# Patient Record
Sex: MALE | Race: WHITE
[De-identification: names, ages, dates, MRNs, and addresses within clinical notes are randomized per-mention and may not be internally consistent; named-entity substitution may affect disease eponyms.]

---

## 2021-06-23 ENCOUNTER — HOSPITAL ENCOUNTER (INPATIENT)
Dept: HOSPITAL 11 - JP.ED | Age: 86
LOS: 3 days | Discharge: HOME | DRG: 292 | End: 2021-06-26
Attending: INTERNAL MEDICINE | Admitting: INTERNAL MEDICINE
Payer: MEDICARE

## 2021-06-23 DIAGNOSIS — Z79.51: ICD-10-CM

## 2021-06-23 DIAGNOSIS — Z95.1: ICD-10-CM

## 2021-06-23 DIAGNOSIS — Z88.0: ICD-10-CM

## 2021-06-23 DIAGNOSIS — I11.0: Primary | ICD-10-CM

## 2021-06-23 DIAGNOSIS — I25.10: ICD-10-CM

## 2021-06-23 DIAGNOSIS — Z79.899: ICD-10-CM

## 2021-06-23 DIAGNOSIS — J44.1: ICD-10-CM

## 2021-06-23 DIAGNOSIS — Z98.49: ICD-10-CM

## 2021-06-23 DIAGNOSIS — Z79.82: ICD-10-CM

## 2021-06-23 DIAGNOSIS — E78.00: ICD-10-CM

## 2021-06-23 DIAGNOSIS — R26.81: ICD-10-CM

## 2021-06-23 DIAGNOSIS — I25.2: ICD-10-CM

## 2021-06-23 DIAGNOSIS — Z88.8: ICD-10-CM

## 2021-06-23 DIAGNOSIS — E87.6: ICD-10-CM

## 2021-06-23 DIAGNOSIS — R32: ICD-10-CM

## 2021-06-23 DIAGNOSIS — I50.9: ICD-10-CM

## 2021-06-23 DIAGNOSIS — Z88.1: ICD-10-CM

## 2021-06-23 DIAGNOSIS — Z66: ICD-10-CM

## 2021-06-23 DIAGNOSIS — R09.02: ICD-10-CM

## 2021-06-23 DIAGNOSIS — Z99.81: ICD-10-CM

## 2021-06-23 PROCEDURE — C9113 INJ PANTOPRAZOLE SODIUM, VIA: HCPCS

## 2021-06-23 NOTE — PCM.HP.2
H&P History of Present Illness





- General


Date of Service: 06/23/21


Admit Problem/Dx: 


                           Admission Diagnosis/Problem





Admission Diagnosis/Problem      Chronic obstructive pulmonary disease with


                                 hypoxia








Source of Information: Patient, EMS Notes Reviewed, Provider, RN


History Limitations: Reports: No Limitations





- History of Present Illness


Initial Comments - Free Text/Narative: 





chief complaint: shortness of breath.





This is a 85 year old male presents to the ER via EMS, reports in on vacation at

one of the local resorts near [place], [place]. for one week. Develops shortness of 

breath about 4 days ago with the past two days increase in symptoms. Reports f

eeling dizzy, headache, shortness of breath with any type of activity. He had a 

painful cough.  weakness more today and increased difficulty with walking. Today

could not get up and ambulance was called 


Onset of Symptoms: Reports: Gradual


Symptom Onset Date: 06/20/21


Duration of Symptoms: Reports: Day(s):, Getting Worse


Location: Reports: Chest, Generalized


Quality: Reports: Same as Previous Episode


Severity: Moderate


Improves with: Reports: Rest


Worsens with: Reports: Movement


Context: Reports: Other (excerbation of chronic disease)


Associated Symptoms: Reports: Cough (painful cough), Fever/Chills (chills today 

without fever.), Shortness of Breath, Weakness


  ** Mid-Sternal Chest


Pain Score (Numeric/FACES): 5





- Related Data


Allergies/Adverse Reactions: 


                                    Allergies











Allergy/AdvReac Type Severity Reaction Status Date / Time


 


amoxicillin Allergy  Diarrhea Verified 06/23/21 18:19


 


benzocaine Allergy  Nausea Verified 06/23/21 18:19


 


Penicillins Allergy  Cannot Verified 06/23/21 17:46





   Remember  


 


ramipril Allergy  Cough Verified 06/23/21 18:19











Home Medications: 


                                    Home Meds





Albuterol [Proventil Neb Soln] 1 ampule INH QID PRN 06/23/21 [History]


Aspirin 81 mg PO DAILY 06/23/21 [History]


Aspirin/Dipyridamole [Aggrenox 200-25 MG] 1 cap PO BID 06/23/21 [History]


Azithromycin 250 mg PO DAILY 06/23/21 [History]


Budesonide/Formoterol Fumarate [Symbicort 160-4.5 Mcg Inhaler] 2 puff IH BID 

06/23/21 [History]


Cetirizine [ZyrTEC] 10 mg PO DAILY 06/23/21 [History]


ClonazePAM [KlonoPIN] 0.5 mg PO BID PRN 06/23/21 [History]


Finasteride 5 mg PO DAILY 06/23/21 [History]


Fluticasone Propionate [Flonase] 2 spray NASBOTH DAILY 06/23/21 [History]


Folic Acid 0.8 mg PO DAILY 06/23/21 [History]


Furosemide [Lasix] 40 mg PO Q48H 06/23/21 [History]


Gabapentin [Neurontin] 300 mg PO TID 06/23/21 [History]


Ipratropium [Atrovent HFA Inh] 1 puff INH Q6H PRN 06/23/21 [History]


Isosorbide Mononitrate [Imdur] 15 mg PO DAILY 06/23/21 [History]


Metoprolol Tartrate 50 mg PO BID 06/23/21 [History]


NIFEdipine [Adalat cc] 60 mg PO DAILY 06/23/21 [History]


Nitroglycerin 0.4 mg SL ASDIRECTED PRN 06/23/21 [History]


Omeprazole 20 mg PO ACBREAKFAST 06/23/21 [History]


Roflumilast [Daliresp] 500 mcg PO DAILY 06/23/21 [History]


Sertraline [Zoloft] 150 mg PO DAILY 06/23/21 [History]


Simethicone 80 mg PO QID PRN 06/23/21 [History]


Tamsulosin [Tamsulosin 24 Hr] 0.4 mg PO BEDTIME 06/23/21 [History]


Timolol [Betimol] 1 drop OP BEDTIME 06/23/21 [History]


Zolpidem Tartrate [Ambien] 5 mg PO BEDTIME 06/23/21 [History]


atorvaSTATin [Lipitor] 40 mg PO BEDTIME 06/23/21 [History]


guaiFENesin [Mucinex] 1,200 mg PO DAILY 06/23/21 [History]











Past Medical History


HEENT History: Reports: Cataract


Cardiovascular History: Reports: Bypass, Heart Failure, High Cholesterol, 

Hypertension, MI, SOB on Exertion


Respiratory History: Reports: COPD


Gastrointestinal History: Reports: Other (See Below)


Other Gastrointestinal History: Hernia


Genitourinary History: Reports: Urinary Incontinence


Musculoskeletal History: Reports: Fracture





- Past Surgical History


HEENT Surgical History: Reports: Cataract Surgery


Cardiovascular Surgical History: Reports: Coronary Artery Bypass





Social & Family History





- Tobacco Use


Tobacco Use Status *Q: Never Tobacco User





- Caffeine Use


Caffeine Use: Reports: Coffee





- Recreational Drug Use


Recreational Drug Use: No





- Living Situation & Occupation


Living situation: Reports: 


Occupation: Retired (lives with his Wife who is 33 years younger, together have 

a 25 year old Daughter and 8 year old adopted Son.)





H&P Review of Systems





- Review of Systems:


Review Of Systems: See Below


General: Reports: Chills, Malaise, Weakness, Decreased Appetite


HEENT: Reports: Other (cataracts)


Pulmonary: Reports: Shortness of Breath, Wheezing, Pleuritic Chest Pain, Cough, 

Other (home oxygen and Cpap at night, can use oxygen during the day.)


Cardiovascular: Reports: No Symptoms, Dyspnea on Exertion.  Denies: Edema


Gastrointestinal: Reports: No Symptoms


Genitourinary: Reports: No Symptoms


Musculoskeletal: Reports: No Symptoms


Skin: Reports: No Symptoms


Psychiatric: Reports: No Symptoms


Neurological: Reports: No Symptoms


Hematologic/Lymphatic: Reports: No Symptoms


Immunologic: Reports: No Symptoms





Exam





- Exam


Exam: See Below





- Vital Signs


Vital Signs: 


                                Last Vital Signs











Temp  97.6 F   06/23/21 20:46


 


Pulse  73   06/23/21 20:46


 


Resp  18   06/23/21 20:46


 


BP  135/87   06/23/21 20:46


 


Pulse Ox  98   06/23/21 20:46








                                        





Orthostatic Blood Pressure [     126/70


Standing]                        


Orthostatic Blood Pressure [     138/73


Sitting]                         


Orthostatic Blood Pressure [     134/72


Supine]                          








Weight: 185 lb





- Exam


Quality Assessment: Supplemental Oxygen, DVT Prophylaxis


General: Alert, Oriented, Cooperative, Mild Distress


HEENT: PERRLA, Conjunctiva Clear, EACs Clear, EOMI, Hearing Intact, Mucosa Moist

& Pink


Neck: Supple, Trachea Midline, Full Range of Motion


Lungs: Decreased Breath Sounds (bilateral), Crackles, Rales, Rhonchi, Wheezing 

(bilateral expiratory)


Cardiovascular: Regular Rate, Regular Rhythm, Normal S1, Normal S2, Systolic 

Murmur (3/6)


GI/Abdominal Exam: Normal Bowel Sounds, Soft, Non-Tender


 (Male) Exam: Deferred


Rectal (Males) Exam: Deferred


Back Exam: Normal Inspection, Full Range of Motion


Extremities: Normal Inspection, Normal Range of Motion, Non-Tender, No Pedal 

Edema, Normal Capillary Refill


Peripheral Pulses: 2+: Brachial (L), Brachial (R)


Skin: Warm, Dry, Intact


Neurological: Cranial Nerves Intact, Reflexes Equal Bilateral


Neuro Extensive - Mental Status: Alert, Oriented x3, Normal Mood/Affect, Normal 

Cognition


Neuro Extensive - Motor, Sensory, Reflexes: CN II-XII Intact, Normal Gait, 

Normal Reflexes


Psychiatric: Alert, Normal Affect, Normal Mood





- Patient Data


Lab Results Last 24 hrs: 


                         Laboratory Results - last 24 hr











  06/23/21 06/23/21 06/23/21 Range/Units





  18:50 18:50 19:00 


 


WBC  7.3    (4.5-11.0)  K/uL


 


RBC  3.79 L    (4.30-5.90)  M/uL


 


Hgb  12.4    (12.0-15.0)  g/dL


 


Hct  38.6 L    (40.0-54.0)  %


 


MCV  102 H    (80-98)  fL


 


MCH  33 H    (27-31)  pg


 


MCHC  32    (32-36)  %


 


Plt Count  164    (150-400)  K/uL


 


Neut % (Auto)  60.2    (36-66)  %


 


Lymph % (Auto)  24.6    (24-44)  %


 


Mono % (Auto)  12.7 H    (2-6)  %


 


Eos % (Auto)  2.2    (2-4)  %


 


Baso % (Auto)  0.3    (0-1)  %


 


Sodium   142   (140-148)  mmol/L


 


Potassium   3.3 L   (3.6-5.2)  mmol/L


 


Chloride   103   (100-108)  mmol/L


 


Carbon Dioxide   27   (21-32)  mmol/L


 


Anion Gap   15.3 H   (5.0-14.0)  mmol/L


 


BUN   24 H   (7-18)  mg/dL


 


Creatinine   1.6 H   (0.8-1.3)  mg/dL


 


Est Cr Clr Drug Dosing   32.13   mL/min


 


Estimated GFR (MDRD)   41 L   (>60)  


 


Glucose   101   ()  mg/dL


 


Calcium   8.7   (8.5-10.1)  mg/dL


 


Total Bilirubin   0.3   (0.2-1.0)  mg/dL


 


AST   17   (15-37)  U/L


 


ALT   24   (12-78)  U/L


 


Alkaline Phosphatase   60   ()  U/L


 


Troponin I   < 0.017   (0.000-0.056)  ng/mL


 


NT-Pro-B Natriuret Pep   1699 H   (5-450)  pg/mL


 


Total Protein   6.3 L   (6.4-8.2)  g/dL


 


Albumin   3.6   (3.4-5.0)  g/dL


 


Globulin   2.7   (2.3-3.5)  g/dL


 


Albumin/Globulin Ratio   1.3   (1.2-2.2)  


 


Urine Color    Yellow  (YELLOW)  


 


Urine Appearance    Clear  (CLEAR)  


 


Urine pH    5.5  (5.0-8.0)  


 


Ur Specific Gravity    <= 1.005 L  (1.008-1.030)  


 


Urine Protein    30 H  (NEGATIVE)  mg/dL


 


Urine Glucose (UA)    Negative  (NEGATIVE)  mg/dL


 


Urine Ketones    Negative  (NEGATIVE)  mg/dL


 


Urine Occult Blood    Negative  (NEGATIVE)  


 


Urine Nitrite    Negative  (NEGATIVE)  


 


Urine Bilirubin    Negative  (NEGATIVE)  


 


Urine Urobilinogen    0.2  (0.2-1.0)  EU/dL


 


Ur Leukocyte Esterase    Negative  (NEGATIVE)  


 


Urine RBC    Not seen  (0-5)  


 


Urine WBC    Not seen  (0-5)  


 


Ur Epithelial Cells    Not seen  


 


Amorphous Sediment    Not seen  


 


Urine Bacteria    Not seen  


 


Urine Mucus    Not seen  


 


Urine Other      











Result Diagrams: 


                                 06/23/21 18:50





                                 06/23/21 18:50





Sepsis Event Note





- Evaluation


Sepsis Screening Result: No Definite Risk





- Focused Exam


Vital Signs: 


                                   Vital Signs











  Temp Pulse Resp BP Pulse Ox


 


 06/23/21 20:46  97.6 F  73  18  135/87  98


 


 06/23/21 20:16   72  18  116/83  97


 


 06/23/21 19:02   72  14  140/75  98


 


 06/23/21 18:38   72  16  142/78 H  97


 


 06/23/21 17:51  97.8 F  73  17  144/78 H  99


 


 06/23/21 17:49  97.8 F  73  17  144/78 H  99














- Problem List


(1) Acute exacerbation of chronic obstructive pulmonary disease (COPD)


SNOMED Code(s): 514073154


   ICD Code: J44.1 - CHRONIC OBSTRUCTIVE PULMONARY DISEASE W (ACUTE) 

EXACERBATION   Status: Acute   Priority: High   Current Visit: Yes   





(2) Coronary artery disease


SNOMED Code(s): 81057991


   ICD Code: I25.10 - ATHSCL HEART DISEASE OF NATIVE CORONARY ARTERY W/O ANG 

PCTRS   Status: Acute   Priority: Low   Current Visit: Yes   


Qualifiers: 


   Coronary Disease-Associated Artery/Lesion type: unspecified vessel or lesion 

type   Native vs. transplanted heart: native heart 





(3) Hypertension


SNOMED Code(s): 47140000


   ICD Code: I10 - ESSENTIAL (PRIMARY) HYPERTENSION   Status: Acute   Priority: 

High   Current Visit: Yes   


Qualifiers: 


   Hypertension type: essential hypertension   Qualified Code(s): I10 - 

Essential (primary) hypertension   





(4) Hypokalemia


SNOMED Code(s): 51616348


   ICD Code: E87.6 - HYPOKALEMIA   Status: Acute   Priority: Medium   Current 

Visit: Yes   


Problem List Initiated/Reviewed/Updated: Yes


Orders Last 24hrs: 


                               Active Orders 24 hr











 Category Date Time Status


 


 Patient Status Manage Transfer [TRANSFER] Routine ADT  06/23/21 22:38 Active


 


 EKG Documentation Completion [RC] ASDIRECTED Care  06/23/21 18:43 Active


 


 Orthostatic Vital Signs [RC] ASDIRECTED Care  06/23/21 17:43 Active


 


 Chest 2V [CR] Stat Exams  06/23/21 18:43 Taken


 


 Sodium Chloride 0.9% [Normal Saline] 1,000 ml Med  06/23/21 18:45 Active





 IV ASDIRECTED   


 


 Resuscitation Status Routine Resus Stat  06/23/21 22:43 Ordered


 


 EKG 12 Lead [EK] Routine Ther  06/23/21 18:43 Ordered








                                Medication Orders





Sodium Chloride (Normal Saline)  1,000 mls @ 500 mls/hr IV ASDIRECTED BRADY


   Last Admin: 06/23/21 18:58  Dose: 500 mls/hr


   Documented by: MILLIE








Assessment/Plan Comment:: 


 Assessment/Plan Comment:: 


ASSESSMENT AND PLAN OF CARE- COPD WITH HYPOXIA





reports being short of breath for 4 days the past two day worse with shortness 

of breath and worsen cough. 





ER workup shows elevated  WBC 7.3, hgb 12.4, hct 38.6, Chemistry Na+ 142, K+ 

3.3, Cl 103, anion gap 15.3, BUN 24, Cr 1.6, glucose 101, Co2 27, .  


Xray chest with fibrosis.





Plan to hospital admission for further care and treatment. Patient agree with 

plan of care.





COPD with hypoxia


-Admit to [address] [place] [address] for further monitoring


-IV Fluids for rehydration NS at 75 ml/hr


-IV Antibiotic; Rocephin 1 gram IV every 24 hours


-IV Zithromax 500mg every 24 hours     


-oxygen to keep sats greater than 95%


-IV Solumedrol 62.5 mg every 6 hours  


-schedule Duo nebs every 6 hours, Albuterol nebs every 4 hours prn  


-Cpap at night with oxygen                  


-Advise to notify nurses of any chest pain or other symptoms


-And a.m. labs: CBC, BMP 





Coronary Artery Disease


-continue outpatient medication plan





Hypertension


-continue outpatient medication





Hypokalemia- Potassium 3.3 on admission


-Potassium 20 meq one time


-recheck Potassium in am





Maintenance issues


-Orders home medications: chronic medication


-Nutrition- regular diet


-Mane catheter -not indicated at this time


-DVT - Lovenox 40 mg subcut daily


-PPI - IV Protonix 40mg daily





CODE STATUS: DNR/DNI





Admission status: Admit to [address] [place] [address] justification.  This patient will be admitted for inpatient services 

and is medically appropriate meeting medical necessity for inpatient admission 

as outlined in my documentation.


I reasonably expect the patient will require inpatient services that span.  Time

 over 2 midnights.  I reasonably expect this patient to be discharged or 

transferred within 96 hours after admission to the critical access hospital.





Disposition: home with Family





Primary care provider: Dr. Amrit Gil, [facility].





Hospitalist: Dr. Saavedra








- Mortality Measure


Prognosis:: Good











- Mortality Measure


Prognosis:: Good

## 2021-06-23 NOTE — EDM.PDOC
ED HPI GENERAL MEDICAL PROBLEM





- General


Chief Complaint: Respiratory Problem


Stated Complaint: DIZZY VIA NORTH


Time Seen by Provider: 06/23/21 18:29


Source of Information: Reports: Patient


History Limitations: Reports: No Limitations





- History of Present Illness


INITIAL COMMENTS - FREE TEXT/NARRATIVE: 


Jordan is an 85-year-old male presenting to the ED for evaluation of lighthea

dedness for the last 3 days followed by shortness of breath that started 2 days 

ago and has progressed. The patient has a history significant for cardiac 

disease status post three-vessel CABG in 1996. He has a history of congestive 

heart failure. He also has a history of COPD from working in the [employer] 

industry for 36 years. In addition, he has BPH, hypertension, and 

hyperlipidemia. The patient was in his usual state of health when the dizziness 

began. He is not sure if he has been taking enough fluid. Today he was checking 

his home oxygen saturations and they were low in the 80s. He tried to use his 

rescue inhaler, however, he did not feel any better and EMS was called. He did 

receive a DuoNeb in route and had improvement in his oxygenation and his 

breathing. The patient is also been experiencing intermittent epigastric pain 

over the last 2 days. Episodes last a few seconds and are sharp and stabbing. 

They are not associated with any nausea or vomiting.





  ** Mid-Sternal Chest


Pain Score (Numeric/FACES): 5





- Related Data


                                    Allergies











Allergy/AdvReac Type Severity Reaction Status Date / Time


 


amoxicillin Allergy  Diarrhea Verified 06/23/21 18:19


 


benzocaine Allergy  Nausea Verified 06/23/21 18:19


 


Penicillins Allergy  Cannot Verified 06/23/21 17:46





   Remember  


 


ramipril Allergy  Cough Verified 06/23/21 18:19











Home Meds: 


                                    Home Meds





Albuterol [Proventil Neb Soln] 1 ampule INH QID PRN 06/23/21 [History]


Aspirin 81 mg PO DAILY 06/23/21 [History]


Aspirin/Dipyridamole [Aggrenox 200-25 MG] 1 cap PO BID 06/23/21 [History]


Azithromycin 250 mg PO DAILY 06/23/21 [History]


Budesonide/Formoterol Fumarate [Symbicort 160-4.5 Mcg Inhaler] 2 puff IH BID 

06/23/21 [History]


Cetirizine [ZyrTEC] 10 mg PO DAILY 06/23/21 [History]


ClonazePAM [KlonoPIN] 0.5 mg PO BID PRN 06/23/21 [History]


Finasteride 5 mg PO DAILY 06/23/21 [History]


Fluticasone Propionate [Flonase] 2 spray NASBOTH DAILY 06/23/21 [History]


Folic Acid 0.8 mg PO DAILY 06/23/21 [History]


Furosemide [Lasix] 40 mg PO Q48H 06/23/21 [History]


Gabapentin [Neurontin] 300 mg PO TID 06/23/21 [History]


Ipratropium [Atrovent HFA Inh] 1 puff INH Q6H PRN 06/23/21 [History]


Isosorbide Mononitrate [Imdur] 15 mg PO DAILY 06/23/21 [History]


Metoprolol Tartrate 50 mg PO BID 06/23/21 [History]


NIFEdipine [Adalat cc] 60 mg PO DAILY 06/23/21 [History]


Nitroglycerin 0.4 mg SL ASDIRECTED PRN 06/23/21 [History]


Omeprazole 20 mg PO ACBREAKFAST 06/23/21 [History]


Roflumilast [Daliresp] 500 mcg PO DAILY 06/23/21 [History]


Sertraline [Zoloft] 150 mg PO DAILY 06/23/21 [History]


Simethicone 80 mg PO QID PRN 06/23/21 [History]


Tamsulosin [Tamsulosin 24 Hr] 0.4 mg PO BEDTIME 06/23/21 [History]


Timolol [Betimol] 1 drop OP BEDTIME 06/23/21 [History]


Zolpidem Tartrate [Ambien] 5 mg PO BEDTIME 06/23/21 [History]


atorvaSTATin [Lipitor] 40 mg PO BEDTIME 06/23/21 [History]


guaiFENesin [Mucinex] 1,200 mg PO DAILY 06/23/21 [History]











Past Medical History


HEENT History: Reports: Cataract


Cardiovascular History: Reports: Bypass, Heart Failure, High Cholesterol, 

Hypertension, MI, SOB on Exertion


Respiratory History: Reports: COPD


Gastrointestinal History: Reports: Other (See Below)


Other Gastrointestinal History: Hernia


Genitourinary History: Reports: Urinary Incontinence


Musculoskeletal History: Reports: Fracture





- Past Surgical History


HEENT Surgical History: Reports: Cataract Surgery


Cardiovascular Surgical History: Reports: Coronary Artery Bypass





Social & Family History





- Tobacco Use


Tobacco Use Status *Q: Never Tobacco User





- Caffeine Use


Caffeine Use: Reports: Coffee





- Recreational Drug Use


Recreational Drug Use: No





ED ROS GENERAL





- Review of Systems


Review Of Systems: See Below


Constitutional: Reports: Malaise


HEENT: Reports: No Symptoms


Respiratory: Reports: Shortness of Breath, Wheezing, Cough.  Denies: Sputum


Cardiovascular: Reports: Edema, Lightheadedness


Endocrine: Reports: No Symptoms


GI/Abdominal: Reports: Abdominal Pain (Intermittent sharp, stabbing epigastric 

pain lasting a few seconds.)


: Reports: No Symptoms


Musculoskeletal: Reports: No Symptoms


Skin: Reports: No Symptoms


Neurological: Reports: No Symptoms


Psychiatric: Reports: No Symptoms


Hematologic/Lymphatic: Reports: No Symptoms


Immunologic: Reports: No Symptoms





ED EXAM, GENERAL





- Physical Exam


Exam: See Below


Exam Limited By: No Limitations


General Appearance: Alert, Mild Distress


Eye Exam: Bilateral Eye: EOMI, PERRL


Throat/Mouth: Normal Inspection, Normal Oropharynx, Normal Voice, No Airway 

Compromise


Head: Atraumatic, Normocephalic


Neck: Normal Inspection, Supple, Non-Tender, Full Range of Motion.  No: Carotid 

Bruit


Respiratory/Chest: No Respiratory Distress, No Accessory Muscle Use, Chest Non-

Tender, Decreased Breath Sounds (In the bases bilaterally), Wheezing (Scant 

inspiratory with pronounced expiratory wheezes throughout the lung fields 

bilaterally.), Prolonged Expiration


Cardiovascular: Normal Peripheral Pulses, Regular Rate, Rhythm, Systolic Murmur 

(3/6 systolic ejection murmur)


Peripheral Pulses: 2+: Radial (L), Radial (R), Posterior Tibial (L), Posterior 

Tibial (R)


GI/Abdominal: Normal Bowel Sounds, Soft, Non-Tender


Back Exam: Normal Inspection


Extremities: Normal Inspection, Normal Range of Motion, Pedal Edema (1+)


Neurological: Alert, Oriented, Normal Cognition, No Motor/Sensory Deficits


Psychiatric: Normal Affect


Skin Exam: Warm, Dry, Intact, Normal Color


Lymphatic: No Adenopathy


  ** #1 Interpretation


EKG Date: 06/23/21


Time: 18:52


Rhythm: NSR


Rate (Beats/Min): 71


Axis: Normal


P-Wave: Present (Prolonged VA interval at 246 ms)


QRS: Normal (Nonspecific interventricular conduction delay)


ST-T: Normal


QT: Prolonged


Comparison: NA - No Prior EKG





Course





- Vital Signs


Last Recorded V/S: 


                                Last Vital Signs











Temp  36.4 C   06/23/21 20:46


 


Pulse  73   06/23/21 20:46


 


Resp  18   06/23/21 20:46


 


BP  135/87   06/23/21 20:46


 


Pulse Ox  98   06/23/21 20:46








                                        





Orthostatic Blood Pressure [     126/70


Standing]                        


Orthostatic Blood Pressure [     138/73


Sitting]                         


Orthostatic Blood Pressure [     134/72


Supine]                          











- Orders/Labs/Meds


Orders: 


                               Active Orders 24 hr











 Category Date Time Status


 


 EKG Documentation Completion [RC] ASDIRECTED Care  06/23/21 18:43 Active


 


 Orthostatic Vital Signs [RC] ASDIRECTED Care  06/23/21 17:43 Active


 


 Chest 2V [CR] Stat Exams  06/23/21 18:43 Taken


 


 Sodium Chloride 0.9% [Normal Saline] 1,000 ml Med  06/23/21 18:45 Active





 IV ASDIRECTED   


 


 EKG 12 Lead [EK] Routine Ther  06/23/21 18:43 Ordered








                                Medication Orders





Sodium Chloride (Normal Saline)  1,000 mls @ 500 mls/hr IV ASDIRECTED BRADY


   Last Admin: 06/23/21 18:58  Dose: 500 mls/hr


   Documented by: MILLIE








Labs: 


                                Laboratory Tests











  06/23/21 06/23/21 06/23/21 Range/Units





  18:50 18:50 19:00 


 


WBC  7.3    (4.5-11.0)  K/uL


 


RBC  3.79 L    (4.30-5.90)  M/uL


 


Hgb  12.4    (12.0-15.0)  g/dL


 


Hct  38.6 L    (40.0-54.0)  %


 


MCV  102 H    (80-98)  fL


 


MCH  33 H    (27-31)  pg


 


MCHC  32    (32-36)  %


 


Plt Count  164    (150-400)  K/uL


 


Neut % (Auto)  60.2    (36-66)  %


 


Lymph % (Auto)  24.6    (24-44)  %


 


Mono % (Auto)  12.7 H    (2-6)  %


 


Eos % (Auto)  2.2    (2-4)  %


 


Baso % (Auto)  0.3    (0-1)  %


 


Sodium   142   (140-148)  mmol/L


 


Potassium   3.3 L   (3.6-5.2)  mmol/L


 


Chloride   103   (100-108)  mmol/L


 


Carbon Dioxide   27   (21-32)  mmol/L


 


Anion Gap   15.3 H   (5.0-14.0)  mmol/L


 


BUN   24 H   (7-18)  mg/dL


 


Creatinine   1.6 H   (0.8-1.3)  mg/dL


 


Est Cr Clr Drug Dosing   32.13   mL/min


 


Estimated GFR (MDRD)   41 L   (>60)  


 


Glucose   101   ()  mg/dL


 


Calcium   8.7   (8.5-10.1)  mg/dL


 


Total Bilirubin   0.3   (0.2-1.0)  mg/dL


 


AST   17   (15-37)  U/L


 


ALT   24   (12-78)  U/L


 


Alkaline Phosphatase   60   ()  U/L


 


Troponin I   < 0.017   (0.000-0.056)  ng/mL


 


NT-Pro-B Natriuret Pep   1699 H   (5-450)  pg/mL


 


Total Protein   6.3 L   (6.4-8.2)  g/dL


 


Albumin   3.6   (3.4-5.0)  g/dL


 


Globulin   2.7   (2.3-3.5)  g/dL


 


Albumin/Globulin Ratio   1.3   (1.2-2.2)  


 


Urine Color    Yellow  (YELLOW)  


 


Urine Appearance    Clear  (CLEAR)  


 


Urine pH    5.5  (5.0-8.0)  


 


Ur Specific Gravity    <= 1.005 L  (1.008-1.030)  


 


Urine Protein    30 H  (NEGATIVE)  mg/dL


 


Urine Glucose (UA)    Negative  (NEGATIVE)  mg/dL


 


Urine Ketones    Negative  (NEGATIVE)  mg/dL


 


Urine Occult Blood    Negative  (NEGATIVE)  


 


Urine Nitrite    Negative  (NEGATIVE)  


 


Urine Bilirubin    Negative  (NEGATIVE)  


 


Urine Urobilinogen    0.2  (0.2-1.0)  EU/dL


 


Ur Leukocyte Esterase    Negative  (NEGATIVE)  


 


Urine RBC    Not seen  (0-5)  


 


Urine WBC    Not seen  (0-5)  


 


Ur Epithelial Cells    Not seen  


 


Amorphous Sediment    Not seen  


 


Urine Bacteria    Not seen  


 


Urine Mucus    Not seen  


 


Urine Other      











Meds: 


Medications











Generic Name Dose Route Start Last Admin





  Trade Name Freq  PRN Reason Stop Dose Admin


 


Sodium Chloride  1,000 mls @ 500 mls/hr  06/23/21 18:45  06/23/21 18:58





  Normal Saline  IV   500 mls/hr





  ASDIRECTED BRADY   Administration














Discontinued Medications














Generic Name Dose Route Start Last Admin





  Trade Name Freq  PRN Reason Stop Dose Admin


 


Al Hydroxide/Mg Hydroxide 15  0 ml  06/23/21 20:43  06/23/21 21:05





  ml/ Lidocaine HCl 15 ml  PO  06/23/21 20:44  30 ml





  ONETIME ONE   Administration














- Radiology Interpretation


Free Text/Narrative:: 


I reviewed the two-view chest x-ray showing hyperinflation of the lungs. Very 

torturous thoracic aorta and normal cardiac silhouette. There is no evidence for

 acute infiltrates.








- Re-Assessments/Exams


Free Text/Narrative Re-Assessment/Exam: 





06/23/21 20:36 I reviewed the patient's chest x-ray showing hyperinflation and 

fibrosis consistent with COPD. He has a very torturous thoracic aorta and normal

 cardiac silhouette. I reviewed his labs showing a leukocyte count of 7.3 with a

 hemoglobin of 12.4 and hematocrit of 38.6. Platelet count is 164,000. His 

comprehensive metabolic panel is significant for sodium 142, potassium 3.3, 

chloride of 103, bicarbonate of 27, BUN of 24 with a creatinine 1.6. His glucose

 is 101. Troponin is negative at less than 0.017. BNP is elevated at 1699. 

Urinalysis was unremarkable. Appears to be acute exacerbation of COPD. The 

patient is already on an aggressive assortment of inhalers.  The wife has 

concerns about taking him home given his limited mobility at this time.  

Normally he is able ambulate without assistance.





06/23/21 21:45 nursing got the patient up and tried to ambulate with him.  He 

desatted very quickly to below 89% on room air.  He was fairly unsteady on his 

feet raising concern about fall at home.  Given his acute exacerbation of COPD 

with hypoxia and his increased unsteadiness, I am recommending that we probably 

admit him for further care.  I discussed the case with Karina Damon CNP who 

agrees with this plan.











Departure





- Departure


Time of Disposition: 22:26


Disposition: Admitted As Inpatient 67


Clinical Impression: 


 Acute exacerbation of chronic obstructive pulmonary disease (COPD), Gait 

instability, Hypoxia








- Discharge Information


Referrals: 


PCP,None [Primary Care Provider] - 


Forms:  ED Department Discharge





Sepsis Event Note (ED)





- Evaluation


Sepsis Screening Result: No Definite Risk





- Focused Exam


Vital Signs: 


                                   Vital Signs











  Temp Pulse Resp BP Pulse Ox


 


 06/23/21 20:46  36.4 C  73  18  135/87  98


 


 06/23/21 20:16   72  18  116/83  97


 


 06/23/21 19:02   72  14  140/75  98


 


 06/23/21 18:38   72  16  142/78 H  97


 


 06/23/21 17:51  36.6 C  73  17  144/78 H  99


 


 06/23/21 17:49  36.6 C  73  17  144/78 H  99














- Problem List & Annotations


(1) Acute exacerbation of chronic obstructive pulmonary disease (COPD)


SNOMED Code(s): 672842978


   Code(s): J44.1 - CHRONIC OBSTRUCTIVE PULMONARY DISEASE W (ACUTE) EXACERBATION

  Status: Acute   Priority: High   Current Visit: Yes   





(2) Gait instability


SNOMED Code(s): 60323045


   Code(s): R26.81 - UNSTEADINESS ON FEET   Status: Acute   Priority: High   

Current Visit: Yes   





(3) Hypoxia


SNOMED Code(s): 439026317


   Code(s): R09.02 - HYPOXEMIA   Status: Acute   Priority: High   Current Visit:

Yes   





- Problem List Review


Problem List Initiated/Reviewed/Updated: Yes





- My Orders


Last 24 Hours: 


My Active Orders





06/23/21 18:43


EKG Documentation Completion [RC] ASDIRECTED 


Chest 2V [CR] Stat 


EKG 12 Lead [EK] Routine 





06/23/21 18:45


Sodium Chloride 0.9% [Normal Saline] 1,000 ml IV ASDIRECTED 














- Assessment/Plan


Last 24 Hours: 


My Active Orders





06/23/21 18:43


EKG Documentation Completion [RC] ASDIRECTED 


Chest 2V [CR] Stat 


EKG 12 Lead [EK] Routine 





06/23/21 18:45


Sodium Chloride 0.9% [Normal Saline] 1,000 ml IV ASDIRECTED

## 2021-06-24 RX ADMIN — TIMOLOL MALEATE SCH DROP: 5 SOLUTION OPHTHALMIC at 21:50

## 2021-06-24 RX ADMIN — DEXTROSE SCH MG: 50 INJECTION, SOLUTION INTRAVENOUS at 17:52

## 2021-06-24 RX ADMIN — Medication SCH CAP: at 15:09

## 2021-06-24 RX ADMIN — DEXTROSE SCH MG: 50 INJECTION, SOLUTION INTRAVENOUS at 05:29

## 2021-06-24 RX ADMIN — DEXTROSE SCH MG: 50 INJECTION, SOLUTION INTRAVENOUS at 12:35

## 2021-06-24 RX ADMIN — FLUTICASONE PROPIONATE SCH SPRAYS: 50 SPRAY, METERED NASAL at 08:18

## 2021-06-24 RX ADMIN — Medication SCH CAP: at 21:49

## 2021-06-24 RX ADMIN — DEXTROSE SCH MG: 50 INJECTION, SOLUTION INTRAVENOUS at 23:43

## 2021-06-24 RX ADMIN — DEXTROSE SCH MG: 50 INJECTION, SOLUTION INTRAVENOUS at 01:00

## 2021-06-24 RX ADMIN — NIFEDIPINE SCH MG: 30 TABLET, FILM COATED, EXTENDED RELEASE ORAL at 08:26

## 2021-06-24 RX ADMIN — Medication SCH MCG: at 15:08

## 2021-06-24 NOTE — CR
CHEST: 2 view

 

CLINICAL HISTORY:Dyspnea, dizziness

 

COMPARISON:None

 

FINDINGS:  Heart is mildly enlarged pulmonary vascularity is normal. Patient has

had a previous sternotomy. There are atherosclerotic changes in the aorta..

There is diffuse interstitial prominence. This may be chronic. Diffuse

pneumonitis is not excluded.

 

IMPRESSION: Cardiomegaly

 

Previous sternotomy

 

Generalized interstitial prominence. Some of this may be chronic. Some

superimposed pneumonitis or edema is not excluded

## 2021-06-24 NOTE — PCM.PN
- General Info


Date of Service: 06/24/21


Admission Dx/Problem (Free Text): 


                           Admission Diagnosis/Problem





Admission Diagnosis/Problem      Chronic obstructive pulmonary disease with


                                 hypoxia








Subjective Update: 


Mr. Albert had no events overnight.  He is saturating in the upper 90s on 1 L

nasal cannula which may not be necessary.  He is having a little trouble 

ambulating on his feet because he does have dizziness when he stands.  He had 

difficulty getting to the bathroom today and needed help.  His biggest complaint

today is weakness.  He is not coughing or producing any sputum.  He is not 

wheezing.  I did have a long discussion with him and his wife today regarding 

his symptoms and his prior medical conditions.  His wife stated that he has had 

this issue for a few days now and it seems to be more so associated with the 

epigastric pain rather than the shortness of breath.  She also states that when 

he had a heart attack in 1996 it expressed as epigastric pain, she specifically 

remembers this because they tried to treat him for stomach issues originally 

before discovering that he was having a heart attack.  Had a triple bypass at 

that time.  He has been on oxygen at night for approximately 10 years and has 

not been evaluated for the need for home oxygen during the daytime or with 

activity.  She says the last time that may have been addressed would have been 

over a year ago before the pandemic began when he was last seen for pneumonia.  

He states that he takes a Z-Fredi on a regular basis to prevent COPD exacerbations

and he is supposed to  this prescription for next week.


Functional Status: Reports: Pain Controlled, Tolerating Diet, Ambulating, 

Urinating





- Review of Systems


General: Reports: Weakness, Appetite.  Denies: Fever, Chills


HEENT: Reports: No Symptoms.  Denies: Headaches, Visual Changes


Pulmonary: Denies: Shortness of Breath, Cough, Wheezing


Cardiovascular: Reports: Edema, Lightheadedness.  Denies: Chest Pain, 

Palpitations


Gastrointestinal: Reports: No Symptoms.  Denies: Abdominal Pain, Constipation, 

Diarrhea, Nausea, Vomiting


Genitourinary: Reports: No Symptoms.  Denies: Dysuria, Frequency, Urgency


Musculoskeletal: Reports: No Symptoms


Skin: Reports: No Symptoms


Neurological: Reports: Dizziness, Weakness.  Denies: Confusion, Headache, 

Syncope


Psychiatric: Reports: No Symptoms.  Denies: Confusion





- Patient Data


Vitals - Most Recent: 


                                Last Vital Signs











Temp  96.6 F L  06/24/21 15:14


 


Pulse  84   06/24/21 15:14


 


Resp  18   06/24/21 15:14


 


BP  109/57 L  06/24/21 15:14


 


Pulse Ox  96   06/24/21 15:14








                                        





Orthostatic Blood Pressure [     126/70


Standing]                        


Orthostatic Blood Pressure [     138/73


Sitting]                         


Orthostatic Blood Pressure [     134/72


Supine]                          








Weight - Most Recent: 185 lb 0.014 oz


I&O - Last 24 Hours: 


                                 Intake & Output











 06/24/21 06/24/21 06/24/21





 06:59 14:59 22:59


 


Intake Total  960 


 


Balance  960 











Lab Results Last 24 Hours: 


                         Laboratory Results - last 24 hr











  06/23/21 06/24/21 06/24/21 Range/Units





  18:50 05:30 05:30 


 


WBC   7.2   (4.5-11.0)  K/uL


 


RBC   3.84 L   (4.30-5.90)  M/uL


 


Hgb   12.5   (12.0-15.0)  g/dL


 


Hct   39.1 L   (40.0-54.0)  %


 


MCV   102 H   (80-98)  fL


 


MCH   33 H   (27-31)  pg


 


MCHC   32   (32-36)  %


 


Plt Count   153   (150-400)  K/uL


 


Neut % (Auto)   90.7 H   (36-66)  %


 


Lymph % (Auto)   7.7 L   (24-44)  %


 


Mono % (Auto)   1.4 L   (2-6)  %


 


Eos % (Auto)   0.1 L   (2-4)  %


 


Baso % (Auto)   0.1   (0-1)  %


 


Sodium  142   144  (140-148)  mmol/L


 


Potassium  3.3 L   4.1  (3.6-5.2)  mmol/L


 


Chloride  103   107  (100-108)  mmol/L


 


Carbon Dioxide  27   28  (21-32)  mmol/L


 


Anion Gap  15.3 H   8.7  (5.0-14.0)  mmol/L


 


BUN  24 H   19 H  (7-18)  mg/dL


 


Creatinine  1.6 H   1.2  (0.8-1.3)  mg/dL


 


Est Cr Clr Drug Dosing  32.13   42.84  mL/min


 


Estimated GFR (MDRD)  41 L   58 L  (>60)  


 


Glucose  101   135 H  ()  mg/dL


 


Calcium  8.7   8.5  (8.5-10.1)  mg/dL


 


Total Bilirubin  0.3    (0.2-1.0)  mg/dL


 


AST  17    (15-37)  U/L


 


ALT  24    (12-78)  U/L


 


Alkaline Phosphatase  60    ()  U/L


 


Troponin I  < 0.017    (0.000-0.056)  ng/mL


 


NT-Pro-B Natriuret Pep  1699 H    (5-450)  pg/mL


 


Total Protein  6.3 L    (6.4-8.2)  g/dL


 


Albumin  3.6    (3.4-5.0)  g/dL


 


Globulin  2.7    (2.3-3.5)  g/dL


 


Albumin/Globulin Ratio  1.3    (1.2-2.2)  











Med Orders - Current: 


                               Current Medications





Acetaminophen (Acetaminophen 325 Mg Tab)  650 mg PO Q4H PRN


   PRN Reason: Pain (Mild 1-3)/fever


Albuterol (Albuterol 0.083% 2.5 Mg/3 Ml Neb Soln)  2.5 mg NEB Q4H PRN


   PRN Reason: Shortness Of Breath;wheezing


Albuterol/Ipratropium (Albuterol/Ipratropium 3.0-0.5 Mg/3 Ml Neb Soln)  3 ml NEB

QIDRT [facility]


   Last Admin: 06/24/21 14:32 Dose:  3 ml


   Documented by: 


Atorvastatin Calcium (Atorvastatin 20 Mg Tab)  40 mg PO BEDTIME BRADY


Azithromycin (Azithromycin 250 Mg Tab)  500 mg PO DAILY [facility]


Bisacodyl (Bisacodyl 5 Mg Tab)  5 mg PO DAILY PRN


   PRN Reason: Constipation


Clonazepam (Clonazepam 0.5 Mg Tab)  0.5 mg PO BID PRN


   PRN Reason: Anxiety


Docusate Sodium (Docusate Sodium 100 Mg Cap)  100 mg PO BID PRN


   PRN Reason: Constipation


Enoxaparin Sodium (Enoxaparin 40 Mg/0.4 Ml Syringe)  40 mg SUBCUT DAILY [facility]


   Last Admin: 06/24/21 08:19 Dose:  40 mg


   Documented by: 


Finasteride (Finasteride 5 Mg Tab)  5 mg PO DAILY [facility]


   Last Admin: 06/24/21 08:26 Dose:  5 mg


   Documented by: 


Fluticasone Propionate (Fluticasone Propionate Nasal Spray 16 Gm Bottle)  0 gm 

NASBOTH DAILY [facility]


   Last Admin: 06/24/21 08:18 Dose:  2 sprays


   Documented by: 


Folic Acid (Folic Acid 1 Mg Tab)  1 mg PO DAILY [facility]


   Last Admin: 06/24/21 12:35 Dose:  1 mg


   Documented by: 


Furosemide (Furosemide 40 Mg Tab)  40 mg PO Q48H [facility]


   Last Admin: 06/24/21 08:20 Dose:  40 mg


   Documented by: 


Gabapentin (Gabapentin 300 Mg Cap)  300 mg PO TID [facility]


   Last Admin: 06/24/21 14:24 Dose:  300 mg


   Documented by: 


Isosorbide Mononitrate (Isosorbide Mononitrate 30 Mg Tab.Er)  15 mg PO DAILY [facility]


   Last Admin: 06/24/21 08:21 Dose:  15 mg


   Documented by: 


Methylprednisolone Sodium Succinate (Methylprednisolone Sodium Succinate 125 

Mg/2 Ml Sdv)  62.5 mg IV Q6H [facility]


   Last Admin: 06/24/21 17:52 Dose:  62.5 mg


   Documented by: 


Metoprolol Tartrate (Metoprolol Tartrate 50 Mg Tab)  50 mg PO BID [facility]


   Last Admin: 06/24/21 08:24 Dose:  50 mg


   Documented by: 


Morphine Sulfate (Morphine 2 Mg/Ml Syringe)  2 mg IVPUSH Q2H PRN


   PRN Reason: Pain (severe 7-10)


Nifedipine (Nifedipine 30 Mg Tab.Er)  60 mg PO DAILY [facility]


   Last Admin: 06/24/21 08:26 Dose:  60 mg


   Documented by: 


Nitroglycerin (Nitroglycerin 0.4 Mg Tab.Sl)  0.4 mg SL ASDIRECTED PRN


   PRN Reason: Chest Pain


(Aspirin/Dipyridamole [ Aggrenox 200-25 Mg] 1 Cap Cap.Er)  0 cap PO BID [facility]


   Last Admin: 06/24/21 15:09 Dose:  1 cap


   Documented by: 


(Roflumilast [ Daliresp] 500 Mcg Tablet)*Pom*  0 mcg PO DAILY [facility]


   Last Admin: 06/24/21 15:08 Dose:  1 mcg


   Documented by: 


Ondansetron HCl (Ondansetron 4 Mg Tab.Dis)  4 mg PO Q6H PRN


   PRN Reason: Nausea able to take PO


Ondansetron HCl (Ondansetron 4 Mg/2 Ml Sdv)  4 mg IV Q4H PRN


   PRN Reason: Nausea/Vomiting


Oxycodone HCl (Oxycodone 5 Mg Tab)  5 mg PO Q4H PRN


   PRN Reason: Pain (moderate 4-6)


Pantoprazole Sodium (Pantoprazole 40 Mg Tab.Cr)  40 mg PO ACBREAKFAST [facility]


Sertraline HCl (Sertraline 50 Mg Tab)  150 mg PO DAILY [facility]


   Last Admin: 06/24/21 08:26 Dose:  150 mg


   Documented by: 


Simethicone (Simethicone 80 Mg Tab.Chew)  80 mg PO QID PRN


   PRN Reason: Gas


Tamsulosin HCl (Tamsulosin 0.4 Mg Cap.Er)  0.4 mg PO BEDTIME [facility]


   Last Admin: 06/24/21 00:57 Dose:  0.4 mg


   Documented by: 


Timolol Maleate (Timolol Maleate 0.5% Ophth Soln 5 Ml Bottle*Pom*)  0 ml EYEBOTH

BEDTIME BRADY


Zolpidem Tartrate (Zolpidem 5 Mg Tab)  5 mg PO BEDTIME [facility]


   Last Admin: 06/24/21 00:58 Dose:  5 mg


   Documented by: 





Discontinued Medications





Al Hydroxide/Mg Hydroxide 15 (ml/ Lidocaine HCl 15 ml)  0 ml PO ONETIME ONE


   Stop: 06/23/21 20:44


   Last Admin: 06/23/21 21:05 Dose:  30 ml


   Documented by: 


Furosemide (Furosemide 40 Mg Tab)  40 mg PO Q48H [facility]


   Last Admin: 06/24/21 01:29 Dose:  Not Given


   Documented by: 


Furosemide (Furosemide 40 Mg/4 Ml Vial)  40 mg IVPUSH ONETIME ONE


   Stop: 06/24/21 16:01


   Last Admin: 06/24/21 16:12 Dose:  40 mg


   Documented by: 


Sodium Chloride (Normal Saline)  1,000 mls @ 500 mls/hr IV ASDIRECTED [facility]


   Last Admin: 06/23/21 18:58 Dose:  500 mls/hr


   Documented by: 


Azithromycin 500 mg/ Sodium (Chloride)  250 mls @ 250 mls/hr IV Q24H [facility]


   Stop: 06/26/21 00:59


   Last Admin: 06/24/21 00:59 Dose:  250 mls/hr


   Documented by: 


Ceftriaxone Sodium 1 gm/ (Sodium Chloride)  50 mls @ 200 mls/hr IV Q24H [facility]


   Stop: 06/30/21 00:01


   Last Admin: 06/24/21 00:59 Dose:  200 mls/hr


   Documented by: 


Sodium Chloride (Normal Saline)  1,000 mls @ 75 mls/hr IV ASDIRECTED [facility]


   Last Admin: 06/24/21 14:24 Dose:  75 mls/hr


   Documented by: 


Pantoprazole Sodium (Pantoprazole 40 Mg Vial)  40 mg IV DAILY [facility]


   Last Admin: 06/24/21 08:29 Dose:  40 mg


   Documented by: 


Potassium Chloride (Potassium Chloride 20 Meq Tab.Er)  20 meq PO ONETIME ONE


   Stop: 06/23/21 23:46


   Last Admin: 06/24/21 00:58 Dose:  20 meq


   Documented by: 


Potassium Chloride (Potassium Chloride 20 Meq Tab.Er)  40 meq PO ONETIME ONE


   Stop: 06/24/21 16:01


   Last Admin: 06/24/21 16:12 Dose:  40 meq


   Documented by: 











- Exam


Quality Assessment: Supplemental Oxygen (1 L and may not be necessary)


General: Alert, Oriented, Cooperative, No Acute Distress


HEENT: Pupils Equal, EOMI, Mucous Membr. Moist/Pink


Lungs: Clear to Auscultation, Normal Respiratory Effort.  No: Wheezing


Cardiovascular: Regular Rate, Regular Rhythm


GI/Abdominal Exam: Normal Bowel Sounds, Soft, Non-Tender, No Distention


Back Exam: Normal Inspection


Extremities: Pedal Edema (1+ bilaterally to the lower calf)


Skin: Warm, Dry, Intact


Neurological: No New Focal Deficit


Psy/Mental Status: Alert, Normal Affect, Normal Mood





- Patient Data


Lab Results Last 24 hrs: 


                         Laboratory Results - last 24 hr











  06/23/21 06/24/21 06/24/21 Range/Units





  18:50 05:30 05:30 


 


WBC   7.2   (4.5-11.0)  K/uL


 


RBC   3.84 L   (4.30-5.90)  M/uL


 


Hgb   12.5   (12.0-15.0)  g/dL


 


Hct   39.1 L   (40.0-54.0)  %


 


MCV   102 H   (80-98)  fL


 


MCH   33 H   (27-31)  pg


 


MCHC   32   (32-36)  %


 


Plt Count   153   (150-400)  K/uL


 


Neut % (Auto)   90.7 H   (36-66)  %


 


Lymph % (Auto)   7.7 L   (24-44)  %


 


Mono % (Auto)   1.4 L   (2-6)  %


 


Eos % (Auto)   0.1 L   (2-4)  %


 


Baso % (Auto)   0.1   (0-1)  %


 


Sodium  142   144  (140-148)  mmol/L


 


Potassium  3.3 L   4.1  (3.6-5.2)  mmol/L


 


Chloride  103   107  (100-108)  mmol/L


 


Carbon Dioxide  27   28  (21-32)  mmol/L


 


Anion Gap  15.3 H   8.7  (5.0-14.0)  mmol/L


 


BUN  24 H   19 H  (7-18)  mg/dL


 


Creatinine  1.6 H   1.2  (0.8-1.3)  mg/dL


 


Est Cr Clr Drug Dosing  32.13   42.84  mL/min


 


Estimated GFR (MDRD)  41 L   58 L  (>60)  


 


Glucose  101   135 H  ()  mg/dL


 


Calcium  8.7   8.5  (8.5-10.1)  mg/dL


 


Total Bilirubin  0.3    (0.2-1.0)  mg/dL


 


AST  17    (15-37)  U/L


 


ALT  24    (12-78)  U/L


 


Alkaline Phosphatase  60    ()  U/L


 


Troponin I  < 0.017    (0.000-0.056)  ng/mL


 


NT-Pro-B Natriuret Pep  1699 H    (5-450)  pg/mL


 


Total Protein  6.3 L    (6.4-8.2)  g/dL


 


Albumin  3.6    (3.4-5.0)  g/dL


 


Globulin  2.7    (2.3-3.5)  g/dL


 


Albumin/Globulin Ratio  1.3    (1.2-2.2)  











Result Diagrams: 


                                 06/24/21 05:30





                                 06/24/21 05:30





Sepsis Event Note





- Evaluation


Sepsis Screening Result: No Definite Risk





- Focused Exam


Vital Signs: 


                                   Vital Signs











  Temp Pulse Pulse Resp BP BP Pulse Ox


 


 06/24/21 15:14  96.6 F L   84  18   109/57 L  96


 


 06/24/21 13:21        96


 


 06/24/21 10:38  97.1 F   92  16   131/64  95


 


 06/24/21 10:24    98    


 


 06/24/21 08:26      151/78 H  


 


 06/24/21 08:24   94    151/78 H  


 


 06/24/21 08:21      151/78 H  


 


 06/24/21 08:04  97.4 F   96  18   140/92 H  95














- Problem List Review


Problem List Initiated/Reviewed/Updated: Yes





- My Orders


Last 24 Hours: 


My Active Orders





06/24/21 11:54


Consult to Physical Therapy [PT Evaluation and Treatment] [CONS] Routine 





06/25/21 09:00


Azithromycin [Zithromax]   500 mg PO DAILY 














- Plan


Plan:: 


 


Acute decompensated heart failure versus COPD exacerbation with hypoxia or possi

ble worsening of COPD with new need for home oxygen


-IV Fluids for rehydration NS at 75 ml/hr


-IV Antibiotic; Rocephin 1 gram IV every 24 hours has been stopped


-IV Zithromax 500mg every 24 hours has been changed to oral   


-oxygen to keep sats greater than 95%


-IV Solumedrol 62.5 mg every 6 hours  


-schedule Duo nebs every 6 hours, Albuterol nebs every 4 hours prn  


-Cpap at night with oxygen                  


-Advise to notify nurses of any chest pain or other symptoms


-Was given 40 mg IV furosemide for concern for fluid overload with possible 

edema on chest x-ray and pedal edema, with patient's history of severe lung 

disease this could possibly be right heart failure related.  His most si

gnificant symptom today is weakness.





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


-continue outpatient medication plan





Essential hypertension


-continue outpatient medication





Hypokalemia- Potassium 3.3 on admission


-Potassium 20 meq one time





Maintenance issues


-Orders home medications: chronic medication


-Nutrition- regular diet


-Mane catheter -not indicated at this time


-DVT - Lovenox 40 mg subcut daily


-PPI - IV Protonix 40mg daily





CODE STATUS: DNR/DNI





Plan: I am concerned that this is not a COPD exacerbation as the patient is a 

poor historian and after getting more details from the wife this may be more 

heart related.  He has improved significantly and does not have a cough or 

sputum production.  He does have a cardiologist in [place].  I think he needs 

to have an echo done when he is discharged.  He was given IV furosemide for his 

fluid overload and will be reassessed in the morning.  If his fluid status has 

improved and he is able to walk then we will discharge him for close follow-up 

with his cardiologist in [place].





Disposition: home with Family





Primary care provider: Dr. Amrit Gil, [facility], [facility].





Silvia Saavedra DO

## 2021-06-25 RX ADMIN — TIMOLOL MALEATE SCH DROP: 5 SOLUTION OPHTHALMIC at 20:28

## 2021-06-25 RX ADMIN — FLUTICASONE PROPIONATE SCH: 50 SPRAY, METERED NASAL at 08:41

## 2021-06-25 RX ADMIN — Medication SCH MCG: at 08:38

## 2021-06-25 RX ADMIN — DEXTROSE SCH MG: 50 INJECTION, SOLUTION INTRAVENOUS at 12:15

## 2021-06-25 RX ADMIN — DEXTROSE SCH MG: 50 INJECTION, SOLUTION INTRAVENOUS at 05:27

## 2021-06-25 RX ADMIN — Medication SCH CAP: at 20:32

## 2021-06-25 RX ADMIN — NIFEDIPINE SCH MG: 30 TABLET, FILM COATED, EXTENDED RELEASE ORAL at 08:36

## 2021-06-25 RX ADMIN — Medication SCH CAP: at 08:39

## 2021-06-25 NOTE — PCM.PN
- General Info


Date of Service: 06/25/21


Admission Dx/Problem (Free Text): 


                           Admission Diagnosis/Problem





Admission Diagnosis/Problem      Acute decompensated heart failure








Subjective Update: 





Mr. Albert was frustrated this morning.  He is doing clinically significantly

better and having significantly less shortness of breath.  He was walked by 

physical therapy for 200 m and only desaturated to 88.  He is not needing oxygen

at baseline.  Will occasionally have momentary dips of oxygen saturation.  He 

has no other complaints besides shortness of breath.





I had to extensive conversations with his wife today because of her desire to 

transfer him to [facility].  I called the [facility] hospitalist team and they did

not have any beds available.  She then wanted him transferred to a different 

hospital this evening not have the transfer until tomorrow morning.  I told her 

that I do not believe he needs to be hospitalized as of tomorrow as his symptoms

of heart failure have significantly improved.  He is still unsteady on his feet 

however this is something that will take time to improve upon.  I did end up 

making a plan with his wife about what to do in the next steps.


Functional Status: Reports: Tolerating Diet, Ambulating, Urinating





- Review of Systems


General: Reports: Weakness, Appetite.  Denies: Fever, Chills


HEENT: Reports: Eye Pain (Chronic), Headaches (He states this is due to his 

right eye pain)


Pulmonary: Reports: No Symptoms.  Denies: Shortness of Breath, Cough, Wheezing


Cardiovascular: Reports: No Symptoms.  Denies: Chest Pain, Palpitations


Gastrointestinal: Reports: No Symptoms.  Denies: Abdominal Pain, Constipation, 

Diarrhea, Nausea, Vomiting


Genitourinary: Reports: No Symptoms.  Denies: Dysuria, Frequency, Urgency


Musculoskeletal: Reports: No Symptoms


Skin: Reports: No Symptoms


Neurological: Reports: No Symptoms


Psychiatric: Reports: No Symptoms





- Patient Data


Vitals - Most Recent: 


                                Last Vital Signs











Temp  97.1 F   06/25/21 19:00


 


Pulse  90   06/25/21 20:29


 


Resp  18   06/25/21 19:00


 


BP  148/72 H  06/25/21 20:29


 


Pulse Ox  95   06/25/21 20:01








                                        





Orthostatic Blood Pressure [     126/70


Standing]                        


Orthostatic Blood Pressure [     138/73


Sitting]                         


Orthostatic Blood Pressure [     134/72


Supine]                          








Weight - Most Recent: 194 lb 4.8 oz


I&O - Last 24 Hours: 


                                 Intake & Output











 06/25/21 06/25/21 06/25/21





 06:59 14:59 22:59


 


Intake Total 200 1240 480


 


Output Total  250 


 


Balance 200 990 480











Lab Results Last 24 Hours: 


                         Laboratory Results - last 24 hr











  06/25/21 06/25/21 Range/Units





  05:54 05:54 


 


WBC  13.0 H   (4.5-11.0)  K/uL


 


RBC  3.60 L   (4.30-5.90)  M/uL


 


Hgb  12.1   (12.0-15.0)  g/dL


 


Hct  36.8 L   (40.0-54.0)  %


 


MCV  102 H   (80-98)  fL


 


MCH  34 H   (27-31)  pg


 


MCHC  33   (32-36)  %


 


Plt Count  168   (150-400)  K/uL


 


Sodium   142  (140-148)  mmol/L


 


Potassium   4.2  (3.6-5.2)  mmol/L


 


Chloride   106  (100-108)  mmol/L


 


Carbon Dioxide   26  (21-32)  mmol/L


 


Anion Gap   10.4  (5.0-14.0)  mmol/L


 


BUN   22 H  (7-18)  mg/dL


 


Creatinine   1.3  (0.8-1.3)  mg/dL


 


Est Cr Clr Drug Dosing   39.45  mL/min


 


Estimated GFR (MDRD)   53 L  (>60)  


 


Glucose   133 H  ()  mg/dL


 


Calcium   8.7  (8.5-10.1)  mg/dL











Med Orders - Current: 


                               Current Medications





Acetaminophen (Acetaminophen 325 Mg Tab)  650 mg PO Q4H PRN


   PRN Reason: Pain (Mild 1-3)/fever


   Last Admin: 06/25/21 17:56 Dose:  650 mg


   Documented by: 


Albuterol (Albuterol 0.083% 2.5 Mg/3 Ml Neb Soln)  2.5 mg NEB Q4H PRN


   PRN Reason: Shortness Of Breath;wheezing


Albuterol/Ipratropium (Albuterol/Ipratropium 3.0-0.5 Mg/3 Ml Neb Soln)  3 ml NEB

QIDRT BRADY


   Last Admin: 06/25/21 20:25 Dose:  Not Given


   Documented by: 


Atorvastatin Calcium (Atorvastatin 20 Mg Tab)  40 mg PO BEDTIME [facility]


   Last Admin: 06/25/21 20:28 Dose:  40 mg


   Documented by: 


Azithromycin (Azithromycin 250 Mg Tab)  500 mg PO DAILY [facility]


   Last Admin: 06/25/21 08:36 Dose:  500 mg


   Documented by: 


Bisacodyl (Bisacodyl 5 Mg Tab)  5 mg PO DAILY PRN


   PRN Reason: Constipation


Clonazepam (Clonazepam 0.5 Mg Tab)  0.5 mg PO BID PRN


   PRN Reason: Anxiety


Docusate Sodium (Docusate Sodium 100 Mg Cap)  100 mg PO BID PRN


   PRN Reason: Constipation


Enoxaparin Sodium (Enoxaparin 40 Mg/0.4 Ml Syringe)  40 mg SUBCUT DAILY [facility]


   Last Admin: 06/25/21 08:35 Dose:  40 mg


   Documented by: 


Finasteride (Finasteride 5 Mg Tab)  5 mg PO DAILY [facility]


   Last Admin: 06/25/21 08:37 Dose:  5 mg


   Documented by: 


Fluticasone Propionate (Fluticasone Propionate Nasal Spray 16 Gm Bottle)  0 gm 

NASBOTH DAILY [facility]


   Last Admin: 06/25/21 08:41 Dose:  Not Given


   Documented by: 


Folic Acid (Folic Acid 1 Mg Tab)  1 mg PO DAILY [facility]


   Last Admin: 06/25/21 08:36 Dose:  1 mg


   Documented by: 


Furosemide (Furosemide 40 Mg Tab)  40 mg PO Q48H [facility]


   Last Admin: 06/24/21 08:20 Dose:  40 mg


   Documented by: 


Gabapentin (Gabapentin 300 Mg Cap)  300 mg PO TID [facility]


   Last Admin: 06/25/21 20:28 Dose:  300 mg


   Documented by: 


Isosorbide Mononitrate (Isosorbide Mononitrate 30 Mg Tab.Er)  15 mg PO DAILY [facility]


   Last Admin: 06/25/21 08:37 Dose:  15 mg


   Documented by: 


Metoprolol Tartrate (Metoprolol Tartrate 50 Mg Tab)  50 mg PO BID [facility]


   Last Admin: 06/25/21 20:29 Dose:  50 mg


   Documented by: 


Morphine Sulfate (Morphine 2 Mg/Ml Syringe)  2 mg IVPUSH Q2H PRN


   PRN Reason: Pain (severe 7-10)


Nifedipine (Nifedipine 30 Mg Tab.Er)  60 mg PO DAILY [facility]


   Last Admin: 06/25/21 08:36 Dose:  60 mg


   Documented by: 


Nitroglycerin (Nitroglycerin 0.4 Mg Tab.Sl)  0.4 mg SL ASDIRECTED PRN


   PRN Reason: Chest Pain


(Aspirin/Dipyridamole [ Aggrenox 200-25 Mg] 1 Cap Cap.Er)  0 cap PO BID [facility]


   Last Admin: 06/25/21 20:32 Dose:  1 cap


   Documented by: 


(Roflumilast [ Daliresp] 500 Mcg Tablet)*Pom*  0 mcg PO DAILY [facility]


   Last Admin: 06/25/21 08:38 Dose:  500 mcg


   Documented by: 


Ondansetron HCl (Ondansetron 4 Mg Tab.Dis)  4 mg PO Q6H PRN


   PRN Reason: Nausea able to take PO


Ondansetron HCl (Ondansetron 4 Mg/2 Ml Sdv)  4 mg IV Q4H PRN


   PRN Reason: Nausea/Vomiting


Oxycodone HCl (Oxycodone 5 Mg Tab)  5 mg PO Q4H PRN


   PRN Reason: Pain (moderate 4-6)


Pantoprazole Sodium (Pantoprazole 40 Mg Tab.Cr)  40 mg PO ACBREAKFAST [facility]


   Last Admin: 06/25/21 07:52 Dose:  40 mg


   Documented by: 


Sertraline HCl (Sertraline 50 Mg Tab)  150 mg PO DAILY [facility]


   Last Admin: 06/25/21 08:37 Dose:  150 mg


   Documented by: 


Simethicone (Simethicone 80 Mg Tab.Chew)  80 mg PO QID PRN


   PRN Reason: Gas


Tamsulosin HCl (Tamsulosin 0.4 Mg Cap.Er)  0.4 mg PO BEDTIME [facility]


   Last Admin: 06/25/21 20:27 Dose:  0.4 mg


   Documented by: 


Timolol Maleate (Timolol Maleate 0.5% Ophth Soln 5 Ml Bottle*Pom*)  0 ml EYEBOTH

BEDTIME [facility]


   Last Admin: 06/25/21 20:28 Dose:  1 drop


   Documented by: 


Zolpidem Tartrate (Zolpidem 5 Mg Tab)  5 mg PO BEDTIME [facility]


   Last Admin: 06/24/21 21:46 Dose:  5 mg


   Documented by: 





Discontinued Medications





Al Hydroxide/Mg Hydroxide 15 (ml/ Lidocaine HCl 15 ml)  0 ml PO ONETIME ONE


   Stop: 06/23/21 20:44


   Last Admin: 06/23/21 21:05 Dose:  30 ml


   Documented by: 


Furosemide (Furosemide 40 Mg Tab)  40 mg PO Q48H [facility]


   Last Admin: 06/24/21 01:29 Dose:  Not Given


   Documented by: 


Furosemide (Furosemide 40 Mg/4 Ml Vial)  40 mg IVPUSH ONETIME ONE


   Stop: 06/24/21 16:01


   Last Admin: 06/24/21 16:12 Dose:  40 mg


   Documented by: 


Sodium Chloride (Normal Saline)  1,000 mls @ 500 mls/hr IV ASDIRECTED [facility]


   Last Admin: 06/23/21 18:58 Dose:  500 mls/hr


   Documented by: 


Azithromycin 500 mg/ Sodium (Chloride)  250 mls @ 250 mls/hr IV Q24H [facility]


   Stop: 06/26/21 00:59


   Last Admin: 06/24/21 00:59 Dose:  250 mls/hr


   Documented by: 


Ceftriaxone Sodium 1 gm/ (Sodium Chloride)  50 mls @ 200 mls/hr IV Q24H [facility]


   Stop: 06/30/21 00:01


   Last Admin: 06/24/21 00:59 Dose:  200 mls/hr


   Documented by: 


Sodium Chloride (Normal Saline)  1,000 mls @ 75 mls/hr IV ASDIRECTED [facility]


   Last Admin: 06/24/21 14:24 Dose:  75 mls/hr


   Documented by: 


Methylprednisolone Sodium Succinate (Methylprednisolone Sodium Succinate 125 

Mg/2 Ml Sdv)  62.5 mg IV Q6H [facility]


   Last Admin: 06/25/21 12:15 Dose:  62.5 mg


   Documented by: 


Pantoprazole Sodium (Pantoprazole 40 Mg Vial)  40 mg IV DAILY [facility]


   Last Admin: 06/24/21 08:29 Dose:  40 mg


   Documented by: 


Potassium Chloride (Potassium Chloride 20 Meq Tab.Er)  20 meq PO ONETIME ONE


   Stop: 06/23/21 23:46


   Last Admin: 06/24/21 00:58 Dose:  20 meq


   Documented by: 


Potassium Chloride (Potassium Chloride 20 Meq Tab.Er)  40 meq PO ONETIME ONE


   Stop: 06/24/21 16:01


   Last Admin: 06/24/21 16:12 Dose:  40 meq


   Documented by: 











- Exam


General: Alert, Oriented, Cooperative, No Acute Distress


HEENT: Pupils Equal, EOMI, Mucous Membr. Moist/Pink


Lungs: Clear to Auscultation, Normal Respiratory Effort


Cardiovascular: Regular Rate, Regular Rhythm


GI/Abdominal Exam: Normal Bowel Sounds, Soft, Non-Tender, No Organomegaly, No 

Distention


Back Exam: Normal Inspection


Extremities: Normal Inspection, Pedal Edema (Improved since yesterday, 1+ to the

ankle bilaterally)


Peripheral Pulses: 2+: Radial (L) (Normal), Radial (R) (Normal)


Skin: Warm, Dry, Intact


Neurological: No New Focal Deficit


Psy/Mental Status: Alert, Normal Affect, Normal Mood





- Patient Data


Lab Results Last 24 hrs: 


                         Laboratory Results - last 24 hr











  06/25/21 06/25/21 Range/Units





  05:54 05:54 


 


WBC  13.0 H   (4.5-11.0)  K/uL


 


RBC  3.60 L   (4.30-5.90)  M/uL


 


Hgb  12.1   (12.0-15.0)  g/dL


 


Hct  36.8 L   (40.0-54.0)  %


 


MCV  102 H   (80-98)  fL


 


MCH  34 H   (27-31)  pg


 


MCHC  33   (32-36)  %


 


Plt Count  168   (150-400)  K/uL


 


Sodium   142  (140-148)  mmol/L


 


Potassium   4.2  (3.6-5.2)  mmol/L


 


Chloride   106  (100-108)  mmol/L


 


Carbon Dioxide   26  (21-32)  mmol/L


 


Anion Gap   10.4  (5.0-14.0)  mmol/L


 


BUN   22 H  (7-18)  mg/dL


 


Creatinine   1.3  (0.8-1.3)  mg/dL


 


Est Cr Clr Drug Dosing   39.45  mL/min


 


Estimated GFR (MDRD)   53 L  (>60)  


 


Glucose   133 H  ()  mg/dL


 


Calcium   8.7  (8.5-10.1)  mg/dL











Result Diagrams: 


                                 06/25/21 05:54





                                 06/25/21 05:54





Sepsis Event Note





- Evaluation


Sepsis Screening Result: No Definite Risk





- Focused Exam


Vital Signs: 


                                   Vital Signs











  Temp Temp Pulse Pulse Resp BP BP


 


 06/25/21 20:29    90    148/72 H 


 


 06/25/21 20:01       


 


 06/25/21 19:00   97.1 F   88  18   150/73 H


 


 06/25/21 15:47   98.1 F   88  16   124/60


 


 06/25/21 14:40     82   


 


 06/25/21 12:48       


 


 06/25/21 10:53     84   


 


 06/25/21 10:45  98.2 F    83  20   140/86














  Pulse Ox


 


 06/25/21 20:29 


 


 06/25/21 20:01  95


 


 06/25/21 19:00  100


 


 06/25/21 15:47  94 L


 


 06/25/21 14:40 


 


 06/25/21 12:48  89 L


 


 06/25/21 10:53 


 


 06/25/21 10:45  96














- Problem List Review


Problem List Initiated/Reviewed/Updated: Yes





- My Orders


Last 24 Hours: 


My Active Orders





06/25/21 07:29


Daily Weight [Height and Weight] [RC] 0500 





06/25/21 09:00


Azithromycin [Zithromax]   500 mg PO DAILY 














- Plan


Plan:: 


 


Acute decompensated heart failure versus COPD exacerbation with hypoxia or 

possible worsening of COPD with new need for home oxygen


-IV Antibiotic; Rocephin 1 gram IV every 24 hours has been stopped


-IV Zithromax 500mg every 24 hours has been changed to oral   


-oxygen to keep sats greater than 95%


-IV Solumedrol 62.5 mg every 6 hours has been stopped


-schedule Duo nebs every 6 hours, Albuterol nebs every 4 hours prn  


-Cpap at night with oxygen                  


-Advise to notify nurses of any chest pain or other symptoms


-IV furosemide 40 mg





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


-continue outpatient medication plan





Essential hypertension


-continue outpatient medication





Hypokalemia- Potassium 3.3 on admission


-Potassium 20 meq one time





Maintenance issues


-Orders home medications: chronic medication


-Nutrition- regular diet


-Mane catheter -not indicated at this time


-DVT - Lovenox 40 mg subcut daily


-PPI - IV Protonix 40mg daily





CODE STATUS: DNR/DNI





Plan: [facility] did not have any beds for this patient.  I had extensive 20-

minute and then 30-minute conversation with his wife today about the plans for 

his discharge.  We settled on him being discharged tomorrow with a walker and 

follow-up for physical therapy.  We will try to get this together tomorrow 

morning.  If we are not able to get physical therapy set up for him before she 

wants to leave tomorrow then she is willing to have us call her with that 

information.  She wants him to do physical therapy at [facility] in [place].  

He do have a walker in house that he can use and his wife did sign for that this

 evening.





Disposition: home with Family





Primary care provider: Dr. Amrit Gil, [facility], [facility].





Silvia Saavedra, DO

## 2021-06-26 RX ADMIN — FLUTICASONE PROPIONATE SCH SPRAYS: 50 SPRAY, METERED NASAL at 09:04

## 2021-06-26 RX ADMIN — Medication SCH MCG: at 09:01

## 2021-06-26 RX ADMIN — Medication SCH CAP: at 09:04

## 2021-06-26 RX ADMIN — NIFEDIPINE SCH MG: 30 TABLET, FILM COATED, EXTENDED RELEASE ORAL at 09:02

## 2021-06-26 NOTE — [id number]
**Discharge Summary





- Hospital Course


Free Text/Narrative:: 


Mr. Albert is an 85-year-old white male who presented with shortness of 

breath while staying in the cabin in [facility].  He had had been having 4 

days of shortness of breath and increasing weakness with difficulty walking.  He

was also experiencing 3 days of epigastric chest pain with associated dizziness 

headache shortness of breath and cough.  He was also having episodes of hypoxia 

with oxygen saturations in the 80s.  Was brought by EMS.





He has a history of COPD Gold criteria 4 with frequent exacerbations and 

regularly takes outpatient azithromycin to prevent infection.  He does use 

oxygen at night with his CPAP device and has for the past 10 years.  He also has

an extensive cardiac history that began in the 90s when he had a heart attack 

and triple bypass.  He has had episodes of heart failure since then the most 

recent being about a year ago before the pandemic.





On arrival to the emergency department he received a chest x-ray which showed 

generalized interstitial prominence representing either pneumonitis versus 

edema.  He also had a elevated BNP at 1699.  He was admitted to the floors for 

suspected COPD exacerbation versus heart failure.  He was started on antibiotics

and IV fluids.  He was then given Lasix which significantly improved his 

symptoms.  His weakness did not improve over his stay.  He was seen by PT and is

now walking well with a new walker.  He will go home with an additional dose of 

azithromycin to complete a 5-day course and will follow up with physical therapy

in [place] where he lives.


Diagnosis: Stroke: No


Modified [place] Scale: Slight Disable;Unable to Carry Out Prev Act.Able to Look 

After Affairs


Modified Chaz Scale Score: 2





- Discharge Data


Discharge Date: 06/26/21


Discharge Disposition: Home, Self-Care 01


Condition: Good





- Referral to Home Health


Primary Care Physician: 


PCP None








- Discharge Diagnosis/Problem(s)


(1) Acute decompensated heart failure


SNOMED Code(s): 53554763, 802093164


   ICD Code: I50.9 - HEART FAILURE, UNSPECIFIED   Status: Acute   





(2) COPD (chronic obstructive pulmonary disease)


SNOMED Code(s): 24464927


   ICD Code: J44.9 - CHRONIC OBSTRUCTIVE PULMONARY DISEASE, UNSPECIFIED   

Status: Acute   


Qualifiers: 


   COPD type: chronic bronchitis 





(3) Weakness


SNOMED Code(s): 72493823


   ICD Code: R53.1 - WEAKNESS   Status: Acute   Priority: Low   Onset Date: 

~06/20/21   Problem Details: Unsteady gait requiring a walker for assistance   





- Patient Summary/Data


Consults: 


                                  Consultations





06/24/21 11:54


Consult to Physical Therapy [PT Evaluation and Treatment] [CONS] Routine 


   Please Evaluate and Treat.


   PT Reason for Consult: Balance


   This query below is only for informational purposes and is not editable.


   Admission Diagnosis/Problem: Chronic obstructive pulmonary disease with 

hypoxia














- Patient Instructions


Diet: Heart Healthy Diet





- Discharge Plan


Prescriptions/Med Rec: 


Azithromycin [Zithromax] 500 mg PO DAILY 1 Days  tablet


Home Medications: 


                                    Home Meds





Albuterol [Proventil Neb Soln] 1 ampule INH QID PRN 06/23/21 [History]


Aspirin 81 mg PO DAILY 06/23/21 [History]


Aspirin/Dipyridamole [Aggrenox 200-25 MG] 1 cap PO BID 06/23/21 [History]


Azithromycin 250 mg PO DAILY 06/23/21 [History]


Budesonide/Formoterol Fumarate [Symbicort 160-4.5 Mcg Inhaler] 2 puff IH BID 

06/23/21 [History]


Cetirizine [ZyrTEC] 10 mg PO DAILY 06/23/21 [History]


ClonazePAM [KlonoPIN] 0.5 mg PO BID PRN 06/23/21 [History]


Finasteride 5 mg PO DAILY 06/23/21 [History]


Fluticasone Propionate [Flonase] 2 spray NASBOTH DAILY 06/23/21 [History]


Folic Acid 0.8 mg PO DAILY 06/23/21 [History]


Furosemide [Lasix] 40 mg PO Q48H 06/23/21 [History]


Gabapentin [Neurontin] 300 mg PO TID 06/23/21 [History]


Ipratropium [Atrovent HFA] 1 puff INH Q6H PRN 06/23/21 [History]


Isosorbide Mononitrate [Imdur] 15 mg PO DAILY 06/23/21 [History]


Metoprolol Tartrate 50 mg PO BID 06/23/21 [History]


NIFEdipine [Adalat cc] 60 mg PO DAILY 06/23/21 [History]


Nitroglycerin 0.4 mg SL ASDIRECTED PRN 06/23/21 [History]


Omeprazole 20 mg PO ACBREAKFAST 06/23/21 [History]


Roflumilast [Daliresp] 500 mcg PO DAILY 06/23/21 [History]


Sertraline [Zoloft] 150 mg PO DAILY 06/23/21 [History]


Simethicone 80 mg PO QID PRN 06/23/21 [History]


Tamsulosin [Flomax] 0.4 mg PO BEDTIME 06/23/21 [History]


Timolol [Betimol 0.5% Ophth Soln] 1 drop OP BEDTIME 06/23/21 [History]


Zolpidem Tartrate [Ambien] 5 mg PO BEDTIME 06/23/21 [History]


atorvaSTATin [Lipitor] 40 mg PO BEDTIME 06/23/21 [History]


guaiFENesin [Mucinex] 1,200 mg PO DAILY 06/23/21 [History]


Azithromycin [Zithromax] 500 mg PO DAILY 1 Days  tablet 06/26/21 [Rx]








Patient Handouts:  Chronic Obstructive Pulmonary Disease Exacerbation, 

Easy-to-Read, Heart Failure, Self Care, Easy-to-Read


Referrals: 


PCP,None [Primary Care Provider] - 





- Discharge Summary/Plan Comment


DC Time >30 min.: Yes


Discharge Summary/Plan Comment: 


I had extensive conversations with his wife over the past 3 days regarding his 

condition and follow-up.  We came together on a plan to have him do outpatient 

therapy for the weakness in [place].  We also decided to continue the 

azithromycin course to complete 5 days as he would have had to take in this 

outpatient from his PCP next week anyways.  She was advised to follow-up with 

his PCP for the weakness and with his cardiologist regarding the acute 

decompensated heart failure.  She was also advised that the cardiologist would 

likely want to get a repeat echo as he has not had an echo in years he has had 

at least 2 exacerbations since then.





- General Info


Date of Service: 06/26/21


Admission Dx/Problem (Free Text: 


                           Admission Diagnosis/Problem





Admission Diagnosis/Problem      Acute decompensated heart failure, Weakness








Subjective Update: 


Mr. Albert is walking well with a walker and feeling significantly less weak.

 He is not requiring any additional oxygen at this time.  He is having no 

shortness of breath, epigastric pain, chest pain, or palpitations.  He had no 

complaints at the time of discharge.


Functional Status: Reports: Tolerating Diet, Ambulating, Urinating.  Denies: New

Symptoms





- Review of Systems


General: Reports: Weakness (Significantly improved), Appetite.  Denies: Fever, 

Fatigue


HEENT: Reports: Eye Pain, Other (He states the glaucoma in his right eye is 

bothering him but he will follow up with ophthalmology)


Pulmonary: Reports: No Symptoms.  Denies: Shortness of Breath, Pleuritic Chest 

Pain, Cough, Wheezing


Cardiovascular: Reports: No Symptoms.  Denies: Chest Pain, Palpitations, Dyspnea

on Exertion


Gastrointestinal: Reports: No Symptoms.  Denies: Abdominal Pain, Constipation, 

Diarrhea, Nausea, Vomiting


Genitourinary: Reports: No Symptoms.  Denies: Dysuria, Frequency, Urgency


Musculoskeletal: Reports: No Symptoms


Skin: Reports: No Symptoms


Neurological: Reports: No Symptoms.  Denies: Dizziness, Headache





- Patient Data


Vitals - Most Recent: 


                                Last Vital Signs











Temp  97.7 F   06/26/21 11:35


 


Pulse  77   06/26/21 11:35


 


Resp  16   06/26/21 11:35


 


BP  102/51 L  06/26/21 11:35


 


Pulse Ox  94 L  06/26/21 11:35








                                        





Orthostatic Blood Pressure [     126/70


Standing]                        


Orthostatic Blood Pressure [     138/73


Sitting]                         


Orthostatic Blood Pressure [     134/72


Supine]                          








Weight - Most Recent: 194 lb 4.8 oz


I&O - Last 24 hours: 


                                 Intake & Output











 06/25/21 06/26/21 06/26/21





 22:59 06:59 14:59


 


Intake Total 480 360 320


 


Balance 480 360 320











Med Orders - Current: 


                               Current Medications








Discontinued Medications





Acetaminophen (Acetaminophen 325 Mg Tab)  650 mg PO Q4H PRN


   PRN Reason: Pain (Mild 1-3)/fever


   Last Admin: 06/26/21 09:00 Dose:  650 mg


   Documented by: 


Albuterol (Albuterol 0.083% 2.5 Mg/3 Ml Neb Soln)  2.5 mg NEB Q4H PRN


   PRN Reason: Shortness Of Breath;wheezing


   Last Admin: 06/26/21 02:39 Dose:  2.5 mg


   Documented by: 


Albuterol/Ipratropium (Albuterol/Ipratropium 3.0-0.5 Mg/3 Ml Neb Soln)  3 ml NEB

QIDRT BRADY


   Last Admin: 06/26/21 10:45 Dose:  3 ml


   Documented by: 


Atorvastatin Calcium (Atorvastatin 20 Mg Tab)  40 mg PO BEDTIME [facility]


   Last Admin: 06/25/21 20:28 Dose:  40 mg


   Documented by: 


Azithromycin (Azithromycin 250 Mg Tab)  500 mg PO DAILY [facility]


   Last Admin: 06/26/21 09:05 Dose:  500 mg


   Documented by: 


Bisacodyl (Bisacodyl 5 Mg Tab)  5 mg PO DAILY PRN


   PRN Reason: Constipation


Clonazepam (Clonazepam 0.5 Mg Tab)  0.5 mg PO BID PRN


   PRN Reason: Anxiety


Al Hydroxide/Mg Hydroxide 15 (ml/ Lidocaine HCl 15 ml)  0 ml PO ONETIME ONE


   Stop: 06/23/21 20:44


   Last Admin: 06/23/21 21:05 Dose:  30 ml


   Documented by: 


Docusate Sodium (Docusate Sodium 100 Mg Cap)  100 mg PO BID PRN


   PRN Reason: Constipation


Enoxaparin Sodium (Enoxaparin 40 Mg/0.4 Ml Syringe)  40 mg SUBCUT DAILY [facility]


   Last Admin: 06/26/21 09:05 Dose:  40 mg


   Documented by: 


Finasteride (Finasteride 5 Mg Tab)  5 mg PO DAILY [facility]


   Last Admin: 06/26/21 09:02 Dose:  5 mg


   Documented by: 


Fluticasone Propionate (Fluticasone Propionate Nasal Spray 16 Gm Bottle)  0 gm 

NASBOTH DAILY [facility]


   Last Admin: 06/26/21 09:04 Dose:  2 sprays


   Documented by: 


Folic Acid (Folic Acid 1 Mg Tab)  1 mg PO DAILY [facility]


   Last Admin: 06/26/21 09:04 Dose:  1 mg


   Documented by: 


Furosemide (Furosemide 40 Mg Tab)  40 mg PO Q48H [facility]


   Last Admin: 06/24/21 01:29 Dose:  Not Given


   Documented by: 


Furosemide (Furosemide 40 Mg Tab)  40 mg PO Q48H [facility]


   Last Admin: 06/26/21 07:43 Dose:  40 mg


   Documented by: 


Furosemide (Furosemide 40 Mg/4 Ml Vial)  40 mg IVPUSH ONETIME ONE


   Stop: 06/24/21 16:01


   Last Admin: 06/24/21 16:12 Dose:  40 mg


   Documented by: 


Gabapentin (Gabapentin 300 Mg Cap)  300 mg PO TID [facility]


   Last Admin: 06/26/21 09:03 Dose:  300 mg


   Documented by: 


Sodium Chloride (Normal Saline)  1,000 mls @ 500 mls/hr IV ASDIRECTED [facility]


   Last Admin: 06/23/21 18:58 Dose:  500 mls/hr


   Documented by: 


Azithromycin 500 mg/ Sodium (Chloride)  250 mls @ 250 mls/hr IV Q24H [facility]


   Stop: 06/26/21 00:59


   Last Admin: 06/24/21 00:59 Dose:  250 mls/hr


   Documented by: 


Ceftriaxone Sodium 1 gm/ (Sodium Chloride)  50 mls @ 200 mls/hr IV Q24H [facility]


   Stop: 06/30/21 00:01


   Last Admin: 06/24/21 00:59 Dose:  200 mls/hr


   Documented by: 


Sodium Chloride (Normal Saline)  1,000 mls @ 75 mls/hr IV ASDIRECTED [facility]


   Last Admin: 06/24/21 14:24 Dose:  75 mls/hr


   Documented by: 


Isosorbide Mononitrate (Isosorbide Mononitrate 30 Mg Tab.Er)  15 mg PO DAILY [facility]


   Last Admin: 06/26/21 09:03 Dose:  15 mg


   Documented by: 


Methylprednisolone Sodium Succinate (Methylprednisolone Sodium Succinate 125 

Mg/2 Ml Sdv)  62.5 mg IV Q6H [facility]


   Last Admin: 06/25/21 12:15 Dose:  62.5 mg


   Documented by: 


Metoprolol Tartrate (Metoprolol Tartrate 50 Mg Tab)  50 mg PO BID [facility]


   Last Admin: 06/26/21 09:05 Dose:  50 mg


   Documented by: 


Morphine Sulfate (Morphine 2 Mg/Ml Syringe)  2 mg IVPUSH Q2H PRN


   PRN Reason: Pain (severe 7-10)


Nifedipine (Nifedipine 30 Mg Tab.Er)  60 mg PO DAILY [facility]


   Last Admin: 06/26/21 09:02 Dose:  60 mg


   Documented by: 


Nitroglycerin (Nitroglycerin 0.4 Mg Tab.Sl)  0.4 mg SL ASDIRECTED PRN


   PRN Reason: Chest Pain


(Aspirin/Dipyridamole [ Aggrenox 200-25 Mg] 1 Cap Cap.Er)  0 cap PO BID [facility]


   Last Admin: 06/26/21 09:04 Dose:  1 cap


   Documented by: 


(Roflumilast [ Daliresp] 500 Mcg Tablet)*Pom*  0 mcg PO DAILY [facility]


   Last Admin: 06/26/21 09:01 Dose:  1 mcg


   Documented by: 


Ondansetron HCl (Ondansetron 4 Mg Tab.Dis)  4 mg PO Q6H PRN


   PRN Reason: Nausea able to take PO


Ondansetron HCl (Ondansetron 4 Mg/2 Ml Sdv)  4 mg IV Q4H PRN


   PRN Reason: Nausea/Vomiting


Oxycodone HCl (Oxycodone 5 Mg Tab)  5 mg PO Q4H PRN


   PRN Reason: Pain (moderate 4-6)


Pantoprazole Sodium (Pantoprazole 40 Mg Vial)  40 mg IV DAILY [facility]


   Last Admin: 06/24/21 08:29 Dose:  40 mg


   Documented by: 


Pantoprazole Sodium (Pantoprazole 40 Mg Tab.Cr)  40 mg PO ACBREAKFAST [facility]


   Last Admin: 06/26/21 07:43 Dose:  40 mg


   Documented by: 


Potassium Chloride (Potassium Chloride 20 Meq Tab.Er)  20 meq PO ONETIME ONE


   Stop: 06/23/21 23:46


   Last Admin: 06/24/21 00:58 Dose:  20 meq


   Documented by: 


Potassium Chloride (Potassium Chloride 20 Meq Tab.Er)  40 meq PO ONETIME ONE


   Stop: 06/24/21 16:01


   Last Admin: 06/24/21 16:12 Dose:  40 meq


   Documented by: 


Sertraline HCl (Sertraline 50 Mg Tab)  150 mg PO DAILY [facility]


   Last Admin: 06/26/21 09:06 Dose:  150 mg


   Documented by: 


Simethicone (Simethicone 80 Mg Tab.Chew)  80 mg PO QID PRN


   PRN Reason: Gas


Tamsulosin HCl (Tamsulosin 0.4 Mg Cap.Er)  0.4 mg PO BEDTIME [facility]


   Last Admin: 06/25/21 20:27 Dose:  0.4 mg


   Documented by: 


Timolol Maleate (Timolol Maleate 0.5% Ophth Soln 5 Ml Bottle*Pom*)  0 ml EYEBOTH

BEDTIME [facility]


   Last Admin: 06/25/21 20:28 Dose:  1 drop


   Documented by: 


Zolpidem Tartrate (Zolpidem 5 Mg Tab)  5 mg PO BEDTIME [facility]


   Last Admin: 06/25/21 21:31 Dose:  5 mg


   Documented by: 











- Exam


Quality Assessment: Denies: Supplemental Oxygen


General: Reports: Alert, Oriented, Cooperative, No Acute Distress


HEENT: Reports: Pupils Equal, EOMI, Mucous Membr. Moist/Pink


Lungs: Reports: Clear to Auscultation, Normal Respiratory Effort


Cardiovascular: Reports: Regular Rate, Regular Rhythm


GI/Abdominal Exam: Normal Bowel Sounds, Soft, Non-Tender, No Distention


Back Exam: Reports: Normal Inspection


Extremities: Pedal Edema (1+ to the ankle and usual for him)


Skin: Reports: Warm, Dry, Intact


Neurological: Reports: No New Focal Deficit


Psy/Mental Status: Reports: Alert, Normal Affect, Normal Mood